# Patient Record
Sex: FEMALE | Race: BLACK OR AFRICAN AMERICAN | NOT HISPANIC OR LATINO | URBAN - METROPOLITAN AREA
[De-identification: names, ages, dates, MRNs, and addresses within clinical notes are randomized per-mention and may not be internally consistent; named-entity substitution may affect disease eponyms.]

---

## 2018-06-24 ENCOUNTER — EMERGENCY (EMERGENCY)
Facility: HOSPITAL | Age: 28
LOS: 0 days | Discharge: HOME | End: 2018-06-25
Attending: EMERGENCY MEDICINE
Payer: SELF-PAY

## 2018-06-24 DIAGNOSIS — M79.89 OTHER SPECIFIED SOFT TISSUE DISORDERS: ICD-10-CM

## 2018-06-24 DIAGNOSIS — L02.612 CUTANEOUS ABSCESS OF LEFT FOOT: ICD-10-CM

## 2018-06-24 DIAGNOSIS — M79.675 PAIN IN LEFT TOE(S): ICD-10-CM

## 2018-06-24 PROCEDURE — 99283 EMERGENCY DEPT VISIT LOW MDM: CPT

## 2018-06-25 VITALS
TEMPERATURE: 98 F | RESPIRATION RATE: 17 BRPM | DIASTOLIC BLOOD PRESSURE: 69 MMHG | HEIGHT: 65 IN | HEART RATE: 91 BPM | WEIGHT: 169.98 LBS | OXYGEN SATURATION: 100 % | SYSTOLIC BLOOD PRESSURE: 132 MMHG

## 2018-06-25 VITALS
SYSTOLIC BLOOD PRESSURE: 157 MMHG | TEMPERATURE: 98 F | OXYGEN SATURATION: 97 % | RESPIRATION RATE: 18 BRPM | DIASTOLIC BLOOD PRESSURE: 91 MMHG | HEART RATE: 94 BPM

## 2018-06-25 RX ORDER — AZTREONAM 2 G
2 VIAL (EA) INJECTION
Qty: 40 | Refills: 0 | OUTPATIENT
Start: 2018-06-25 | End: 2018-07-04

## 2018-06-25 RX ADMIN — Medication 2 TABLET(S): at 05:14

## 2018-06-25 NOTE — ED PROVIDER NOTE - MEDICAL DECISION MAKING DETAILS
drainaged performed. patient now in good condition and discharged with care instructions. she is to follow up with pmd.

## 2018-06-25 NOTE — ED PROVIDER NOTE - OBJECTIVE STATEMENT
Pertinent PMH/PSH/FHx/SHx and Review of Systems contained within:  27 yo f with no PMH presents in ED c/o pain, swelling and drainage from left great toe. Patient reports waking up with symptoms and thinks she was bitten my spider. No aggravating or relieving factors, No fever/chills, No photophobia/eye pain/changes in vision, No ear pain/sore throat/dysphagia, No chest pain/palpitations, no SOB/cough/wheeze/stridor, No abdominal pain, No N/V/D, no dysuria/frequency/discharge, No neck/back pain, no rash, no changes in neurological status/function.

## 2019-02-26 ENCOUNTER — EMERGENCY (EMERGENCY)
Facility: HOSPITAL | Age: 29
LOS: 0 days | Discharge: HOME | End: 2019-02-26
Payer: MEDICAID

## 2019-02-26 VITALS
HEIGHT: 64 IN | RESPIRATION RATE: 19 BRPM | SYSTOLIC BLOOD PRESSURE: 126 MMHG | OXYGEN SATURATION: 100 % | TEMPERATURE: 98 F | WEIGHT: 175.05 LBS | DIASTOLIC BLOOD PRESSURE: 77 MMHG | HEART RATE: 92 BPM

## 2019-02-26 PROCEDURE — 99283 EMERGENCY DEPT VISIT LOW MDM: CPT | Mod: 25

## 2019-02-26 PROCEDURE — 10061 I&D ABSCESS COMP/MULTIPLE: CPT

## 2019-02-26 RX ORDER — IBUPROFEN 200 MG
1 TABLET ORAL
Qty: 28 | Refills: 0
Start: 2019-02-26 | End: 2019-03-04

## 2019-02-26 RX ORDER — IBUPROFEN 200 MG
600 TABLET ORAL ONCE
Qty: 0 | Refills: 0 | Status: COMPLETED | OUTPATIENT
Start: 2019-02-26 | End: 2019-02-26

## 2019-02-26 RX ADMIN — Medication 600 MILLIGRAM(S): at 12:37

## 2019-02-26 RX ADMIN — Medication 600 MILLIGRAM(S): at 12:36

## 2019-02-26 RX ADMIN — Medication 300 MILLIGRAM(S): at 12:37

## 2019-02-26 NOTE — ED ADULT TRIAGE NOTE - CHIEF COMPLAINT QUOTE
patient c/o of L knee pain ( behind the knee ) , red , worm  tach mild swelling , denied chest pain denied difficulty breathing , patient travels for 12 H with the bus yesterday

## 2019-02-26 NOTE — ED PROVIDER NOTE - OBJECTIVE STATEMENT
this is a 27 yo F c/o of left ankle pain and swelling at inner thigh x 2 days. Denies nausea, vomiting, fever, sob, chest pain.

## 2019-02-26 NOTE — ED ADULT NURSE REASSESSMENT NOTE - NS ED NURSE REASSESS COMMENT FT1
patient A&Ox3 in no acute distress , patient refuses the discharge vital sign , as per Myriam HAZEL no need for wound culture a this time , patient OK to be discharge home , patient discharge as orders no heplock left ER self ambulated

## 2019-02-27 DIAGNOSIS — L02.416 CUTANEOUS ABSCESS OF LEFT LOWER LIMB: ICD-10-CM

## 2019-02-27 DIAGNOSIS — M25.572 PAIN IN LEFT ANKLE AND JOINTS OF LEFT FOOT: ICD-10-CM

## 2019-06-20 ENCOUNTER — EMERGENCY (EMERGENCY)
Facility: HOSPITAL | Age: 29
LOS: 0 days | Discharge: HOME | End: 2019-06-20
Attending: EMERGENCY MEDICINE
Payer: MEDICAID

## 2019-06-20 VITALS
HEART RATE: 87 BPM | RESPIRATION RATE: 20 BRPM | OXYGEN SATURATION: 100 % | HEIGHT: 64 IN | DIASTOLIC BLOOD PRESSURE: 73 MMHG | WEIGHT: 169.98 LBS | SYSTOLIC BLOOD PRESSURE: 126 MMHG | TEMPERATURE: 98 F

## 2019-06-20 VITALS
OXYGEN SATURATION: 100 % | SYSTOLIC BLOOD PRESSURE: 101 MMHG | HEART RATE: 75 BPM | RESPIRATION RATE: 18 BRPM | TEMPERATURE: 99 F | DIASTOLIC BLOOD PRESSURE: 59 MMHG

## 2019-06-20 DIAGNOSIS — R51 HEADACHE: ICD-10-CM

## 2019-06-20 DIAGNOSIS — G43.011 MIGRAINE WITHOUT AURA, INTRACTABLE, WITH STATUS MIGRAINOSUS: ICD-10-CM

## 2019-06-20 LAB
ALBUMIN SERPL ELPH-MCNC: 4 G/DL — SIGNIFICANT CHANGE UP (ref 3.3–5)
ALP SERPL-CCNC: 52 U/L — SIGNIFICANT CHANGE UP (ref 40–120)
ALT FLD-CCNC: 15 U/L — SIGNIFICANT CHANGE UP (ref 12–78)
ANION GAP SERPL CALC-SCNC: 6 MMOL/L — SIGNIFICANT CHANGE UP (ref 5–17)
AST SERPL-CCNC: 10 U/L — LOW (ref 15–37)
BASOPHILS # BLD AUTO: 0.03 K/UL — SIGNIFICANT CHANGE UP (ref 0–0.2)
BASOPHILS NFR BLD AUTO: 0.6 % — SIGNIFICANT CHANGE UP (ref 0–2)
BILIRUB SERPL-MCNC: 0.4 MG/DL — SIGNIFICANT CHANGE UP (ref 0.2–1.2)
BUN SERPL-MCNC: 9 MG/DL — SIGNIFICANT CHANGE UP (ref 7–23)
CALCIUM SERPL-MCNC: 8.8 MG/DL — SIGNIFICANT CHANGE UP (ref 8.5–10.1)
CHLORIDE SERPL-SCNC: 107 MMOL/L — SIGNIFICANT CHANGE UP (ref 96–108)
CO2 SERPL-SCNC: 27 MMOL/L — SIGNIFICANT CHANGE UP (ref 22–31)
CREAT SERPL-MCNC: 0.7 MG/DL — SIGNIFICANT CHANGE UP (ref 0.5–1.3)
EOSINOPHIL # BLD AUTO: 0.1 K/UL — SIGNIFICANT CHANGE UP (ref 0–0.5)
EOSINOPHIL NFR BLD AUTO: 2.1 % — SIGNIFICANT CHANGE UP (ref 0–6)
GLUCOSE SERPL-MCNC: 80 MG/DL — SIGNIFICANT CHANGE UP (ref 70–99)
HCG SERPL-ACNC: <1 MIU/ML — SIGNIFICANT CHANGE UP
HCT VFR BLD CALC: 36.4 % — SIGNIFICANT CHANGE UP (ref 34.5–45)
HGB BLD-MCNC: 11.2 G/DL — LOW (ref 11.5–15.5)
IMM GRANULOCYTES NFR BLD AUTO: 0.2 % — SIGNIFICANT CHANGE UP (ref 0–1.5)
LYMPHOCYTES # BLD AUTO: 1.94 K/UL — SIGNIFICANT CHANGE UP (ref 1–3.3)
LYMPHOCYTES # BLD AUTO: 40 % — SIGNIFICANT CHANGE UP (ref 13–44)
MANUAL SMEAR VERIFICATION: SIGNIFICANT CHANGE UP
MCHC RBC-ENTMCNC: 21.8 PG — LOW (ref 27–34)
MCHC RBC-ENTMCNC: 30.8 GM/DL — LOW (ref 32–36)
MCV RBC AUTO: 70.8 FL — LOW (ref 80–100)
MONOCYTES # BLD AUTO: 0.34 K/UL — SIGNIFICANT CHANGE UP (ref 0–0.9)
MONOCYTES NFR BLD AUTO: 7 % — SIGNIFICANT CHANGE UP (ref 2–14)
NEUTROPHILS # BLD AUTO: 2.43 K/UL — SIGNIFICANT CHANGE UP (ref 1.8–7.4)
NEUTROPHILS NFR BLD AUTO: 50.1 % — SIGNIFICANT CHANGE UP (ref 43–77)
NRBC # BLD: 0 /100 WBCS — SIGNIFICANT CHANGE UP (ref 0–0)
PLAT MORPH BLD: NORMAL — SIGNIFICANT CHANGE UP
PLATELET # BLD AUTO: 273 K/UL — SIGNIFICANT CHANGE UP (ref 150–400)
POTASSIUM SERPL-MCNC: 3.9 MMOL/L — SIGNIFICANT CHANGE UP (ref 3.5–5.3)
POTASSIUM SERPL-SCNC: 3.9 MMOL/L — SIGNIFICANT CHANGE UP (ref 3.5–5.3)
PROT SERPL-MCNC: 7.8 GM/DL — SIGNIFICANT CHANGE UP (ref 6–8.3)
RBC # BLD: 5.14 M/UL — SIGNIFICANT CHANGE UP (ref 3.8–5.2)
RBC # FLD: 14.5 % — SIGNIFICANT CHANGE UP (ref 10.3–14.5)
RBC BLD AUTO: SIGNIFICANT CHANGE UP
SODIUM SERPL-SCNC: 140 MMOL/L — SIGNIFICANT CHANGE UP (ref 135–145)
WBC # BLD: 4.85 K/UL — SIGNIFICANT CHANGE UP (ref 3.8–10.5)
WBC # FLD AUTO: 4.85 K/UL — SIGNIFICANT CHANGE UP (ref 3.8–10.5)

## 2019-06-20 PROCEDURE — 99284 EMERGENCY DEPT VISIT MOD MDM: CPT | Mod: 25

## 2019-06-20 PROCEDURE — 70450 CT HEAD/BRAIN W/O DYE: CPT | Mod: 26

## 2019-06-20 RX ORDER — PROPRANOLOL HCL 160 MG
1 CAPSULE, EXTENDED RELEASE 24HR ORAL
Qty: 60 | Refills: 0
Start: 2019-06-20 | End: 2019-07-19

## 2019-06-20 RX ORDER — PROPRANOLOL HCL 160 MG
10 CAPSULE, EXTENDED RELEASE 24HR ORAL ONCE
Refills: 0 | Status: COMPLETED | OUTPATIENT
Start: 2019-06-20 | End: 2019-06-20

## 2019-06-20 RX ORDER — PROPRANOLOL HCL 160 MG
60 CAPSULE, EXTENDED RELEASE 24HR ORAL ONCE
Refills: 0 | Status: DISCONTINUED | OUTPATIENT
Start: 2019-06-20 | End: 2019-06-20

## 2019-06-20 RX ADMIN — Medication 10 MILLIGRAM(S): at 10:53

## 2019-06-20 NOTE — ED PROVIDER NOTE - ASSOCIATED PAIN OR INJURY
Physical Therapy    Received PT eval orders. Chart reviewed. Pt in dialysis and nurse spoke with pt about therapies coming to see her after but pt stated to nursing that even if they came she would not work with them. Nurse states pt is for d/c this day. Will complete order. Please re-consult as appropriate if situation changes.     Nik Keller, PT no discrete location documentation necessary

## 2019-06-20 NOTE — ED ADULT TRIAGE NOTE - CHIEF COMPLAINT QUOTE
pt states, " Last few months I have been having headaches on and off, I was treated for migraines a few ago"

## 2019-06-20 NOTE — ED PROVIDER NOTE - PHYSICAL EXAMINATION
Marx:  General: No distress.  Mentation at baseline.   HEENT: WNL  Chest/Lungs: CTAB, No wheeze, No retractions, No increased work of breathing, Normal rate  Heart: S1S2 RRR, No M/R/G, Pules equal Bilaterally in upper and lower extremities distally  Abd: soft, NT/ND, No guarding, No rebound.  No hernias, no palpable masses.  Extrem: FROM in all joints, no significant edema noted, No ulcers.  Cap refil < 2sec.  Skin: No rash noted, warm dry.  Neuro:  Grossly normal.  No difficulty ambulating. No focal deficits.  Psychiatric: No evidence of delusions. No SI/HI.

## 2019-06-20 NOTE — ED PROVIDER NOTE - CARE PROVIDER_API CALL
Vance Temple (MD)  Neurology; Pain Medicine; Psychiatry  [address] [facility]  [place] [address]  Phone: [phone]  Fax: [phone]  Follow Up Time: 7-10 Days

## 2019-06-20 NOTE — ED ADULT NURSE NOTE - NSIMPLEMENTINTERV_GEN_ALL_ED
Implemented All Fall with Harm Risk Interventions:  [place]din to call system. Call bell, personal items and telephone within reach. Instruct patient to call for assistance. Room bathroom lighting operational. Non-slip footwear when patient is off stretcher. Physically safe environment: no spills, clutter or unnecessary equipment. Stretcher in lowest position, wheels locked, appropriate side rails in place. Provide visual cue, wrist band, yellow gown, etc. Monitor gait and stability. Monitor for mental status changes and reorient to person, place, and time. Review medications for side effects contributing to fall risk. Reinforce activity limits and safety measures with patient and family. Provide visual clues: red socks.

## 2019-06-20 NOTE — ED ADULT TRIAGE NOTE - NS ED NURSE DIRECT TO ROOM YN
Total Square Area In Cm2 (Required For Proper Billing): 40 Comments: Treatment restart on scalp psoriasis. \\n\\n Location #2: PSO - mid occipital scalp Total Energy In Joules: 30.40 Treatment Number: 60 % Increase/Decrease From Last Treatment: 0 Location #1: Chest No Consent: Written consent obtained, risks reviewed including but not limited to crusting, scabbing, blistering, scarring, darker or lighter pigmentary change, incidental hair removal, bruising, and/or incomplete removal. Detail Level: Zone Post-Care Instructions: I reviewed with the patient in detail post-care instructions. Patient should stay away from the sun and wear sun protection until treated areas are fully healed. Dose Setting #1 (Mj/Cm2): 500 Dose Setting #2 (Mj/Cm2): 1150

## 2019-11-05 NOTE — ED ADULT NURSE NOTE - NSSISCREENINGQ3_ED_A_ED
Called dad and explained refill process with controlled medications. Also let him know patient will be due after next 3 months, to call for them and give enough time as it is holiday season.   
Reason for Call:  Medication or medication refill:    Do you use a [facility]?  Name of the pharmacy and phone number for the current request:  [facility] [place]    Name of the medication requested: ADHD med, almost out    Other request: Patients dad was informed of message regarding lab appointment too and would like to know if that is a reason the med is not being filled as set up? Please call and discuss if needed.     Can we leave a detailed message on this number? YES    Phone number patient can be reached at: Cell number on file:    Telephone Information:   Mobile [phone]     Best Time: Any    Call taken on 11/5/2019 at 11:47 AM by Shani Bowman      
No

## 2021-04-02 NOTE — ED PROVIDER NOTE - CPE EDP NEURO NORM
[Dear  ___] : Dear  [unfilled], [Consult Letter:] : I had the pleasure of evaluating your patient, [unfilled]. [Consult Closing:] : Thank you very much for allowing me to participate in the care of this patient.  If you have any questions, please do not hesitate to contact me. [FreeTextEntry1] : \par \par Address: [address] [place] [address]\par Phone: [phone] normal...

## 2022-04-12 NOTE — ED ADULT NURSE NOTE - NS ED NOTE ABUSE SUSPICION NEGLECT YN
[facility]  [facility] [place]      Pediatric Call Center Scheduling and Nurse Questions:  [phone]  Eveline Ramos, RN Care Coordinator    After hours urgent matters that cannot wait until the next business day:  [phone].  Ask for the on-call pediatric doctor for the specialty you are calling for be paged.    For dermatology urgent matters that cannot wait until the next business day, is over a holiday and/or a weekend please call [phone] and ask for the Dermatology Resident On-Call to be paged.    Prescription Renewals:  Please call your pharmacy first.  Your pharmacy must fax requests to [phone].  Please allow 2-3 days for prescriptions to be authorized.    If your physician has ordered a CT or MRI, you may schedule this test by calling [facility] in [place] at [phone].    **If your child is having a sedated procedure, they will need a history and physical done at their Primary Care Provider within 30 days of the procedure.  If your child was seen by the ordering provider in our office within 30 days of the procedure, their visit summary will work for the H&P unless they inform you otherwise.  If you have any questions, please call the RN Care Coordinator.**    **If your child is going to be admitted to [facility] for testing or a procedure, they will need a PCR COVID test within 4 days of admission.  A [facility] scheduling team should be contacting you to schedule.  If you do not hear from them, you can call [phone] to schedule**      
No

## 2023-04-07 NOTE — ED ADULT NURSE NOTE - OBJECTIVE STATEMENT
4
patient c/o of pain behind the L knee started yesterdays , redness worm and pain noted on touch , denied chest pain denied difficulty breathing , denied dizziness

## 2023-05-03 NOTE — ED PROVIDER NOTE - RESPIRATORY, MLM
"Daily Note     Today's date: 5/3/2023  Patient name: Sonam Mccarty  : 1963  MRN: [id number]  Referring provider: Lloyd Xie MD  Dx:   Encounter Diagnosis     ICD-10-CM    1  Neck pain  M54 2                      Subjective: \" I haven't had pain for days but I have some this morning\"     Objective: See treatment diary below    Assessment: Avis Hamilton presents with neck pain  Noted continued restrictions throughout the R side of the neck today- mild improvement post release  Heavy cuing throughout to reduce shoulder hiking with mobility  Tolerated session without adverse effects  Recommend continued skilled therapy to improve overall strength and mobility for functional return with decreased compensation and pain  Plan: Continue per plan of care  Progress treatment as tolerated         Precautions: highly irritable neck and low back symptoms that are chronic, DOESN'T LIKE TO LIE ON BACK AT ALL UNLESS HAS BOLSTER FOR A PILLOW      Manuals 5/3 3/17 3/20 3/23 3/27 3/30 4/3 4/6 4/12 4/25   Seated cerv mx work [address] [facility]  [address]  [address]  [address] K[address] HD                Assess neural tension      This visit                    Neuro Re-Ed             Seated chin tucks 15x5\" holds  10x 3\" holds  12x 5\" holds  12x 5\" holds  15x 5\" holds  15x 5\" holds  15x 5\" holds 15x5\" holds 15x5\" holds                scap 4   20x3\" holds grn low rows, black rows 2x8 6 5 lbs low rows, 10 5 lbs rows  2x10 grn low rows, blue rows  2x10 grn low rows, blue rows  2x10 grn low rows, blue rows  2x12 grn low rows, blue rows 20x 3\" holds grn low rows, black rows  20x 3\" holds grn low rows, black rows 20x3\" holds grn low rows, black rows 20x3\" holds grn low rows, black rows                V upper trap wall slides 2x12  2x10  2x8  2x8  2x10 2x12 2x12 2x12 2x12                             Ther Ex             scap retractions seated             Seated thoracic rotation B                          UBE or treadmill 4 min  4 min  4 min  4 min  4 min  4 min  4 " "min  4 min 4 min   4 min   Seated thoracic extension 15x5\" holds 10x 3\" holds 10x 3\" holds  12x 3\" holds  12x 3\" holds  15x 3\" holds  15x 5\" holds  15x 5\" holds 15x5: holds 15x5: holds   No moneys  2x10 orange  2x10 orange  2x10 orange  2x10 orange  2x12 orange  3x10 orange  20x 3\" holds  20x 3\" holds 20x3\" holds GTB 2x10   Seated CT butterfly stretch 2x10  5\" holds 10x 3\" holds 10x 3\" holds  10x 3\" holds  12x 5\" holds  15x 5\" holds  15x 5\" holds 15x 5\" holds 15x5\" holds 2x10  5\" holds   Doorway pec stretch 3x20\" holds low Low 3x20\" holds  3x20\" holds low  3x20\" holds low 3x20\" holds low 3x20\" holds low 3x20\" holds low  3x20 holds low  3x20 holds low 3x20\" holds low   R first rib mob 3\" holds 2x10    8x 3\" holds  10x 3\" holds  15x 5\" holds 15x 5\" holds 15x5\" holds 3\" holds 2x10   Lying with bolster for pillow thoracic ext over hor       This visit       sidelying shoulder ER R     If tolerated        Ther Activity                                       Gait Training                                       Modalities                                            " Breath sounds clear and equal bilaterally.

## 2023-05-24 ENCOUNTER — EMERGENCY (EMERGENCY)
Facility: HOSPITAL | Age: 33
LOS: 0 days | Discharge: HOME | End: 2023-05-25
Attending: STUDENT IN AN ORGANIZED HEALTH CARE EDUCATION/TRAINING PROGRAM
Payer: MEDICAID

## 2023-05-24 VITALS
TEMPERATURE: 99 F | OXYGEN SATURATION: 100 % | HEART RATE: 83 BPM | SYSTOLIC BLOOD PRESSURE: 135 MMHG | HEIGHT: 65 IN | DIASTOLIC BLOOD PRESSURE: 92 MMHG | RESPIRATION RATE: 18 BRPM | WEIGHT: 199.96 LBS

## 2023-05-24 DIAGNOSIS — R53.1 WEAKNESS: ICD-10-CM

## 2023-05-24 DIAGNOSIS — R68.83 CHILLS (WITHOUT FEVER): ICD-10-CM

## 2023-05-24 DIAGNOSIS — G43.909 MIGRAINE, UNSPECIFIED, NOT INTRACTABLE, WITHOUT STATUS MIGRAINOSUS: ICD-10-CM

## 2023-05-24 DIAGNOSIS — R11.0 NAUSEA: ICD-10-CM

## 2023-05-24 DIAGNOSIS — Z87.42 PERSONAL HISTORY OF OTHER DISEASES OF THE FEMALE GENITAL TRACT: ICD-10-CM

## 2023-05-24 DIAGNOSIS — R10.31 RIGHT LOWER QUADRANT PAIN: ICD-10-CM

## 2023-05-24 DIAGNOSIS — D25.9 LEIOMYOMA OF UTERUS, UNSPECIFIED: ICD-10-CM

## 2023-05-24 LAB
ALBUMIN SERPL ELPH-MCNC: 3.6 G/DL — SIGNIFICANT CHANGE UP (ref 3.3–5)
ALP SERPL-CCNC: 54 U/L — SIGNIFICANT CHANGE UP (ref 40–120)
ALT FLD-CCNC: 15 U/L — SIGNIFICANT CHANGE UP (ref 12–78)
ANION GAP SERPL CALC-SCNC: 5 MMOL/L — SIGNIFICANT CHANGE UP (ref 5–17)
ANISOCYTOSIS BLD QL: SLIGHT — SIGNIFICANT CHANGE UP
APPEARANCE UR: CLEAR — SIGNIFICANT CHANGE UP
AST SERPL-CCNC: 22 U/L — SIGNIFICANT CHANGE UP (ref 15–37)
BACTERIA # UR AUTO: ABNORMAL
BASOPHILS # BLD AUTO: 0 K/UL — SIGNIFICANT CHANGE UP (ref 0–0.2)
BASOPHILS NFR BLD AUTO: 0 % — SIGNIFICANT CHANGE UP (ref 0–2)
BILIRUB SERPL-MCNC: 0.2 MG/DL — SIGNIFICANT CHANGE UP (ref 0.2–1.2)
BILIRUB UR-MCNC: NEGATIVE — SIGNIFICANT CHANGE UP
BUN SERPL-MCNC: 10 MG/DL — SIGNIFICANT CHANGE UP (ref 7–23)
CALCIUM SERPL-MCNC: 8.9 MG/DL — SIGNIFICANT CHANGE UP (ref 8.5–10.1)
CHLORIDE SERPL-SCNC: 107 MMOL/L — SIGNIFICANT CHANGE UP (ref 96–108)
CO2 SERPL-SCNC: 27 MMOL/L — SIGNIFICANT CHANGE UP (ref 22–31)
COLOR SPEC: YELLOW — SIGNIFICANT CHANGE UP
CREAT SERPL-MCNC: 0.8 MG/DL — SIGNIFICANT CHANGE UP (ref 0.5–1.3)
DIFF PNL FLD: ABNORMAL
EGFR: 100 ML/MIN/1.73M2 — SIGNIFICANT CHANGE UP
EOSINOPHIL # BLD AUTO: 0 K/UL — SIGNIFICANT CHANGE UP (ref 0–0.5)
EOSINOPHIL NFR BLD AUTO: 0 % — SIGNIFICANT CHANGE UP (ref 0–6)
EPI CELLS # UR: ABNORMAL
GLUCOSE SERPL-MCNC: 91 MG/DL — SIGNIFICANT CHANGE UP (ref 70–99)
GLUCOSE UR QL: NEGATIVE MG/DL — SIGNIFICANT CHANGE UP
HCG SERPL-ACNC: <1 MIU/ML — SIGNIFICANT CHANGE UP
HCT VFR BLD CALC: 31.1 % — LOW (ref 34.5–45)
HGB BLD-MCNC: 9.5 G/DL — LOW (ref 11.5–15.5)
HYPOCHROMIA BLD QL: SIGNIFICANT CHANGE UP
KETONES UR-MCNC: NEGATIVE — SIGNIFICANT CHANGE UP
LACTATE SERPL-SCNC: 0.6 MMOL/L — LOW (ref 0.7–2)
LEUKOCYTE ESTERASE UR-ACNC: NEGATIVE — SIGNIFICANT CHANGE UP
LYMPHOCYTES # BLD AUTO: 2.54 K/UL — SIGNIFICANT CHANGE UP (ref 1–3.3)
LYMPHOCYTES # BLD AUTO: 39 % — SIGNIFICANT CHANGE UP (ref 13–44)
MANUAL SMEAR VERIFICATION: SIGNIFICANT CHANGE UP
MCHC RBC-ENTMCNC: 20.6 PG — LOW (ref 27–34)
MCHC RBC-ENTMCNC: 30.5 G/DL — LOW (ref 32–36)
MCV RBC AUTO: 67.5 FL — LOW (ref 80–100)
MICROCYTES BLD QL: SIGNIFICANT CHANGE UP
MONOCYTES # BLD AUTO: 0.39 K/UL — SIGNIFICANT CHANGE UP (ref 0–0.9)
MONOCYTES NFR BLD AUTO: 6 % — SIGNIFICANT CHANGE UP (ref 2–14)
NEUTROPHILS # BLD AUTO: 3.51 K/UL — SIGNIFICANT CHANGE UP (ref 1.8–7.4)
NEUTROPHILS NFR BLD AUTO: 54 % — SIGNIFICANT CHANGE UP (ref 43–77)
NITRITE UR-MCNC: NEGATIVE — SIGNIFICANT CHANGE UP
NRBC # BLD: 0 /100 — SIGNIFICANT CHANGE UP (ref 0–0)
NRBC # BLD: SIGNIFICANT CHANGE UP /100 WBCS (ref 0–0)
PH UR: 6.5 — SIGNIFICANT CHANGE UP (ref 5–8)
PLAT MORPH BLD: NORMAL — SIGNIFICANT CHANGE UP
PLATELET # BLD AUTO: 311 K/UL — SIGNIFICANT CHANGE UP (ref 150–400)
POTASSIUM SERPL-MCNC: 4.5 MMOL/L — SIGNIFICANT CHANGE UP (ref 3.5–5.3)
POTASSIUM SERPL-SCNC: 4.5 MMOL/L — SIGNIFICANT CHANGE UP (ref 3.5–5.3)
PROT SERPL-MCNC: 7.2 GM/DL — SIGNIFICANT CHANGE UP (ref 6–8.3)
PROT UR-MCNC: 15 MG/DL
RBC # BLD: 4.61 M/UL — SIGNIFICANT CHANGE UP (ref 3.8–5.2)
RBC # FLD: 15.4 % — HIGH (ref 10.3–14.5)
RBC BLD AUTO: ABNORMAL
RBC CASTS # UR COMP ASSIST: SIGNIFICANT CHANGE UP /HPF (ref 0–4)
SODIUM SERPL-SCNC: 139 MMOL/L — SIGNIFICANT CHANGE UP (ref 135–145)
SP GR SPEC: 1.01 — SIGNIFICANT CHANGE UP (ref 1.01–1.02)
UROBILINOGEN FLD QL: NEGATIVE MG/DL — SIGNIFICANT CHANGE UP
VARIANT LYMPHS # BLD: 1 % — SIGNIFICANT CHANGE UP (ref 0–6)
WBC # BLD: 6.5 K/UL — SIGNIFICANT CHANGE UP (ref 3.8–10.5)
WBC # FLD AUTO: 6.5 K/UL — SIGNIFICANT CHANGE UP (ref 3.8–10.5)
WBC UR QL: SIGNIFICANT CHANGE UP

## 2023-05-24 PROCEDURE — 74177 CT ABD & PELVIS W/CONTRAST: CPT | Mod: 26,MA

## 2023-05-24 PROCEDURE — 76856 US EXAM PELVIC COMPLETE: CPT | Mod: 26

## 2023-05-24 PROCEDURE — 99285 EMERGENCY DEPT VISIT HI MDM: CPT

## 2023-05-24 RX ORDER — MORPHINE SULFATE 50 MG/1
2 CAPSULE, EXTENDED RELEASE ORAL ONCE
Refills: 0 | Status: DISCONTINUED | OUTPATIENT
Start: 2023-05-24 | End: 2023-05-24

## 2023-05-24 RX ORDER — ACETAMINOPHEN 500 MG
1000 TABLET ORAL ONCE
Refills: 0 | Status: COMPLETED | OUTPATIENT
Start: 2023-05-24 | End: 2023-05-24

## 2023-05-24 RX ORDER — ONDANSETRON 8 MG/1
2 TABLET, FILM COATED ORAL ONCE
Refills: 0 | Status: COMPLETED | OUTPATIENT
Start: 2023-05-24 | End: 2023-05-24

## 2023-05-24 RX ORDER — SODIUM CHLORIDE 9 MG/ML
1000 INJECTION INTRAMUSCULAR; INTRAVENOUS; SUBCUTANEOUS ONCE
Refills: 0 | Status: COMPLETED | OUTPATIENT
Start: 2023-05-24 | End: 2023-05-24

## 2023-05-24 RX ADMIN — MORPHINE SULFATE 2 MILLIGRAM(S): 50 CAPSULE, EXTENDED RELEASE ORAL at 19:49

## 2023-05-24 RX ADMIN — SODIUM CHLORIDE 1000 MILLILITER(S): 9 INJECTION INTRAMUSCULAR; INTRAVENOUS; SUBCUTANEOUS at 20:45

## 2023-05-24 RX ADMIN — Medication 1000 MILLIGRAM(S): at 20:16

## 2023-05-24 RX ADMIN — SODIUM CHLORIDE 1000 MILLILITER(S): 9 INJECTION INTRAMUSCULAR; INTRAVENOUS; SUBCUTANEOUS at 19:45

## 2023-05-24 RX ADMIN — ONDANSETRON 2 MILLIGRAM(S): 8 TABLET, FILM COATED ORAL at 19:50

## 2023-05-24 RX ADMIN — Medication 400 MILLIGRAM(S): at 20:01

## 2023-05-24 RX ADMIN — MORPHINE SULFATE 2 MILLIGRAM(S): 50 CAPSULE, EXTENDED RELEASE ORAL at 20:16

## 2023-05-24 NOTE — ED PROVIDER NOTE - PATIENT PORTAL LINK FT
You can access the FollowMyHealth Patient Portal offered by [facility] by registering at the following website: [email or web address]. By joining [facility]’s FollowMyHealth portal, you will also be able to view your health information using other applications (apps) compatible with our system.

## 2023-05-24 NOTE — ED PROVIDER NOTE - OBJECTIVE STATEMENT
33 year old female with no past medical history presents today c/o abdominal pain since last night, pt describes intermittent sharp pains in her rlq associated with nausea, chills and generalized weakness, pt did take tylenol without relief, pt denies sick contacts +h/o fibroids, pt noted to have prolonged period, lmp 5/14/23, pt currently still menstruation

## 2023-05-24 NOTE — ED ADULT TRIAGE NOTE - CHIEF COMPLAINT QUOTE
patient c/o of RLQ abdominal pain started today , denied N/V denied constipation , no other c/o at this time

## 2023-05-24 NOTE — ED ADULT NURSE NOTE - OBJECTIVE STATEMENT
A&OX4. Patient C/O RLQ 9/10 intermitted abdominal pain started last night. patient denies Fevers/  burning with urination/ back pain at this time. patient denies any recent heavy lifting  at this time . LMP 5/14. Patient states longer than normal  menstruation at this time. + blood in urine no PMH

## 2023-05-24 NOTE — ED ADULT TRIAGE NOTE - RESPIRATORY RATE (BREATHS/MIN)
18 Cimzia Counseling:  I discussed with the patient the risks of Cimzia including but not limited to immunosuppression, allergic reactions and infections.  The patient understands that monitoring is required including a PPD at baseline and must alert us or the primary physician if symptoms of infection or other concerning signs are noted.

## 2023-05-24 NOTE — ED PROVIDER NOTE - CARE PROVIDER_API CALL
Mekhi Wynn  OBSTETRICS AND GYNECOLOGY  [address] [id number]  Phone: [phone]  Fax: [phone]  Follow Up Time:

## 2023-05-24 NOTE — ED PROVIDER NOTE - CLINICAL SUMMARY MEDICAL DECISION MAKING FREE TEXT BOX
pt presents today c/o rlq abd pain since lastnight associated with nausea, chills and generalized weakness, pt has h/o fibroids in the past which was removed when she had a c section in the past, her lmp is 5/14/23, continues today, will order labs, ivf, pain control, ct to rule out appy, sono to rule out fibroids, reassess and dispo

## 2023-05-24 NOTE — ED ADULT NURSE NOTE - ED STAT RN HANDOFF DETAILS
Report endorsed to onclev contreras  RN. Safety checks compld this shift/Safety rounds completed hourly.

## 2023-05-25 VITALS
TEMPERATURE: 98 F | OXYGEN SATURATION: 98 % | DIASTOLIC BLOOD PRESSURE: 91 MMHG | SYSTOLIC BLOOD PRESSURE: 130 MMHG | RESPIRATION RATE: 16 BRPM | HEART RATE: 84 BPM

## 2023-05-25 RX ORDER — IBUPROFEN 200 MG
600 TABLET ORAL ONCE
Refills: 0 | Status: COMPLETED | OUTPATIENT
Start: 2023-05-25 | End: 2023-05-25

## 2023-05-25 RX ORDER — IBUPROFEN 200 MG
1 TABLET ORAL
Qty: 20 | Refills: 0
Start: 2023-05-25 | End: 2023-05-29

## 2023-05-25 RX ADMIN — Medication 600 MILLIGRAM(S): at 01:53

## 2023-05-25 NOTE — ED ADULT NURSE REASSESSMENT NOTE - NS ED NURSE REASSESS COMMENT FT1
Pt AAOx4. Steady gait, ambulatory. IV removed. D/C per MD orders. D/C instructions provided and verbalizes understanding of medication regimen and follow up care. Educational material provided. Respirations equal and unlabored with no acute distress noted at this time. Pt denies any pain or complaints at this time.
Report received from RADHA Rivas. patient is a&ox4 on stretcher. Patient awaiting to be seen by provider. NAD noted at this time. No pending orders.
patient returns from [facility]. patient states pain has been constant 8/10, but does not desire additional pain medication at this time. Urine collected and sent. patient going to CT.
patient remains a&ox4 stating pain 8/10, after medication given. patient states "pain is a lot more tolerable now" Patient was offered more pain alternatives, patient states "I do not need any more pain medicine right now, I am ok". NAD noted. Awaiting sono/CT
Pt AAOx4. Pt sitting comfortably in bed with complaint of pelvic pain, Dr Moody made aware. No new orders at this time. Respirations equal and unlabored. No acute distress noted at this time.

## 2023-05-25 NOTE — ED ADULT NURSE REASSESSMENT NOTE - GENERAL PATIENT STATE
comfortable appearance/cooperative
comfortable appearance/cooperative/improvement verbalized/resting/sleeping

## 2023-05-26 LAB
CULTURE RESULTS: SIGNIFICANT CHANGE UP
SPECIMEN SOURCE: SIGNIFICANT CHANGE UP

## 2023-10-05 NOTE — ED ADULT NURSE NOTE - NS ED NOTE  TALK SOMEONE YN
Due to COVID-19 precautions, case collaborations are temporarily being conducted via remote basis.    [facility] Clinical Collaboration Meeting Log    [facility]bur[facility] [id number]    Provider:Claribel Wagner LCSW    Date of consultation:  10/5/2023     [facility] Consultation    Clinicians Present: GABY Centeno    Reason for Seeking Collaboration:  At risk behaviors including suicidal ideation (SI), homicidal ideation (HI), cutting, etc.    Recommendations from Collaboration:  Agree with/confirm current treatment plan     No

## 2024-03-15 NOTE — ED PROVIDER NOTE - CONTEXT
Airway    Performed by: Cara Oviedo CRNA  Authorized by: Cara Oviedo CRNA    Final Airway Type:  Endotracheal airway  Final Endotracheal Airway*:  ETT  ETT Size (mm)*:  7.5  Cuff*:  Regular  Technique Used for Successful ETT Placement:  Video laryngoscopy  Devices/Methods Used in Placement*:  Mask  Intubation Procedure*:  Preoxygenation, ETCO2, Atraumatic, Dentition Unchanged and Pharynx Clear  Insertion Site:  Oral  Blade Type*:  Video Laryngoscope  Blade Size*:  3  Cuff Volume (mL):  5  Measured from*:  Lips  Secured at (cm)*:  25  Placement Verified by: auscultation and capnometry    Glottic View*:  1 - full view of glottis  Attempts*:  1   Patient Identified, Procedure confirmed, Emergency equipment available and Safety protocols followed  Location:  OR  Urgency:  Elective  Difficult Airway: No    Indications for Airway Management:  Anesthesia  Mask Difficulty Assessment:  2 - vent by mask + OA or adjuvant +/- NMBA  Start Time: 3/15/2024 2:06 PM       unknown

## 2024-05-01 NOTE — ED ADULT NURSE NOTE - [facility]
Physical Therapy    PELVIC FLOOR EVALUATION AND TREATMENT    Name: Patty Johnson  MRN: [id number]  : 1948  Today's Date: 24     Time Calculation  Start Time: 1118  Stop Time: 1220  Time Calculation (min): 62 min  11:18-12:20  Assessment: The patient is a 76 y.o. female, with history of overactive bladder, presenting with chief complaint of urinary urgency, frequency, incontinence and nocturia.  Patient was recently having trouble passing bowel movements, but is now resolved with use of fiber supplements and stool softener.  She does have mild fecal urgency, but no incontinence.  She has found great relief with botox injections.  The evaluation focused on education today and assessing hip and core strength.  A pelvic floor muscle assessment will be completed at the next session per patient comfort level and consent.  She presents with significant muscular weakness in hips and core.  Due to this weakness, bed mobility and sit to stand transfers are quite difficult and she was found to be holding her breath to perform these activities.  She was educated today on techniques to manage intra-abdominal pressure using exhalation and abdominal bracing to reduce urinary leakage during these functional tasks.  Return of LE edema toward the urinary system at night may contribute to nocturia.  She will likely benefit from skilled PT to gain strength and pressure management skills to assist with urinary control, rectocele management, ease of bladder emptying to improve her sleep quality and overall quality of life.         Plan:   Treatment/Interventions: Biofeedback, Education/ Instruction, Electrical stimulation, Neuromuscular re-education, Therapeutic activities, Therapeutic exercises  PT Plan: Skilled PT  PT Frequency: 1 time per week  Duration: 6-8 weeks  Onset Date: 24  Certification Period Start Date: 24  Certification Period End Date: 24  Number of Treatments Authorized: No  authorization  Rehab Potential: Good  Plan of Care Agreement: Patient    Interventions: Therapeutic Activity: Pt was educated on PT clinical findings, extensively pelvic anatomy as it relates to symptoms and diagnosis, PT POC and initial HEP with oral and written instruction provided. Patient was instructed to avoid breath holding with bed mobility and sit to stand transfers, rather exhale and engage abdominals as she moves.  She was educated on importance of avoiding pushing to completely empty her bladder, rather use proper posture and breathing techniques instead to avoid increasing prolapse symptoms.  She was educated on the importance of keeping bowels moving well to reduce prolapse symptoms, improving daily water intake to support this.       Current Problem:  1. Muscle weakness        2. Pelvic pain  Referral to Physical Therapy    Follow Up In Physical Therapy      3. Urinary incontinence, unspecified type            Subjective   General  Reason for Referral: R10.2 (ICD-10-CM) - Pelvic pain  Referred By: Dr. Vaughan  Past Medical History Relevant to Rehab: HTN, Asthma, OA, Kidney Disease  Precautions: L NARESH: 8/23, R NARESH 2/15, fell and broke right shoulder 2 years ago and lacks ROM, hands go numb at night and when using phone, [employer]        Pain: no pelvic pain       Objective   PELVIC HISTORY:  Chief Complaint/Description of Symptoms:  Patient reports that she thinks she has a rectal prolapse.  She was blocked up at one point and struggling with bowel movements, but is no longer having that issue, as she is taking a stool softener at night and Fiber supplement during day.  This began after hip surgery last year due to be on pain meds,  now resolved.  Has struggled a lot with OAB - now has much better urinary control with Botox injections.  Last one he used 2x the Botox and that worked very well. Could sleep 6 hours, where before she would get up every 1-2 hours. Her leakage resolved.  Had a stimulator  "implanted but it was not effective, so had to be removed.    Next botox is scheduled for next week because she is regressed and now getting up every 4 hours, and leakage has returned. Must wear a pad just in case and at night.  When she moves to get up at night she leaks.  Medications in the past haven't been helpful. Prior to Botox she had no control. Patient reports some history of vulvar irritation, but used a cream, which helped.  Hasn't hd to use it in awhile.  Wears compression stockings due to LE edema.    HPI: Delivered 3 children, all vaginal births, had tearing.  Hx of d&C, She reports that she doesn't walk well.  She did not do therapy after her last NARESH  Home Environment/Social Factors/Occupation: Retired, , Multi story home.  Shower is upstairs, has 1 HR. 6 steps to enter with 2 HR, but they are very wide and can only use one.      PELVIC PAIN: No pain       Since onset, the symptoms are: improving with Botox  Pain when emptying bladder: No  Pain with wiping or tight clothing: No, occasionally has some burning - used a cream, but has not had to use it in awhile.    Pain with intercourse: not currently active, no pain in the past    EXERCISE:  Do you do Kegels? no   Current exercise regime: no    BLADDER:     Excessive Urinary Urgency:  not really  Daytime Voiding Frequency: every 4 hours \" I don't drink enough water.\"   Nighttime Voiding Frequency: every 4 hours (was every 6 hrs)  Unintentional urine loss frequency: every time she turns over  or sits up   Leakage occurs with: sit up and then stands up (no leakage in January after Botox)  Leakage amount: small  Difficulty initiating flow of urine: no  Slow/weak urine stream: strong   Difficulty starting urine stream/push to urinate: see below  Able to completely empty bladder: sometimes has to wait 30 seconds and then pushes to get the rest out  Frequent UTI's: rarely (in the last couple of years    BOWEL:     Excessive Bowel Urgency: Yes  BM " Frequency: generally daily, multiple times per day  Frequent Diarrhea: NO  Frequent constipation/straining/incomplete emptying: once in awhile  Mitchell Stool Scale rating: small balls most recently.   Unintentional stool loss: no  Stool loss frequency: NA  FI occurs with what activity: NA  FI amount: NA  Consistency of stool when FI occurs: NA  Tests performed by doctor: last colonoscopy over 5 years ago.          INTERNAL VAGINAL EXAMINATION: TBA    POSTURE: Fwd head, rounded shoulders,  Gait:  Independent without a.d., decreased speed, [place]  Transfers: multiple attempts and heavy UE support required for sit to stand transfers, breath holding  Bed mobility: independent, takes increased time, uses log roll     MMT R/L:  Hip flex: 4/4-  Hip abd: 4-/3+  Hip ext: good bridge/good bridge  TA: able to engage      OUTCOMES MEASURE:  PFIQ-7: 18.9    Education:  Outpatient Education  Individual(s) Educated: Patient  Education Provided: Anatomy, Body Mechanics, Home Exercise Program, Physiology, POC  Plan of Care Discussed and Agreed Upon: yes  Patient Response to Education: Patient/Caregiver Verbalized Understanding of Information, Patient/Caregiver Performed Return Demonstration of Exercises/Activities, Patient/Caregiver Asked Appropriate Questions    Careplan Goals:  Problem: Pelvic Floor       Goal: Patient will improve score on PFIQ-7 by 12%  to show a clinically important change in urinary control and function.           Goal: Patient will be independent with pressure management strategies to reduce urinary incontinence and to avoid rectocele/prolapse symptoms.          Problem: Pelvic Floor        Goal: Patient will report ability to perform bed mobility and sit to stand transfers without urinary leakage 90% of the time.           Goal: Patient will normalize day-time voids to every 2-4 hours.           Goal: Patient will report reduction of nocturia to 2x per night or better.          Goal: Patient will  demonstrate appropriate fluid management/water intake  for bladder and bowel health.           Goal: Patient will report increased ease of bladder and bowel emptying without strain.           Goal: Patient will increase hip and trunk strength by 1/3 MMT grade to facilitate muscle performance necessary for ease of functional transfers and urinary control.             Goal: Patient will be independent and adherent to HEP to enhance functional progress and long term management of condition.            Asya Bernabe, PT   [employer]

## 2025-07-31 ENCOUNTER — APPOINTMENT (OUTPATIENT)
Dept: DERMATOLOGY | Facility: CLINIC | Age: 35
End: 2025-07-31
Payer: COMMERCIAL

## 2025-07-31 DIAGNOSIS — L81.9 DISORDER OF PIGMENTATION, UNSPECIFIED: ICD-10-CM

## 2025-07-31 DIAGNOSIS — L70.0 ACNE VULGARIS: ICD-10-CM

## 2025-07-31 DIAGNOSIS — L90.5 SCAR CONDITIONS AND FIBROSIS OF SKIN: ICD-10-CM

## 2025-07-31 DIAGNOSIS — L73.9 FOLLICULAR DISORDER, UNSPECIFIED: ICD-10-CM

## 2025-07-31 PROBLEM — Z00.00 ENCOUNTER FOR PREVENTIVE HEALTH EXAMINATION: Status: ACTIVE | Noted: 2025-07-31

## 2025-07-31 PROCEDURE — 99204 OFFICE O/P NEW MOD 45 MIN: CPT

## 2025-07-31 RX ORDER — DOXYCYCLINE HYCLATE 100 MG/1
100 TABLET, COATED ORAL
Qty: 60 | Refills: 0 | Status: ACTIVE | COMMUNITY
Start: 2025-07-31 | End: 1900-01-01

## 2025-07-31 RX ORDER — CLINDAMYCIN PHOSPHATE 10 MG/ML
1 LOTION TOPICAL
Qty: 1 | Refills: 3 | Status: ACTIVE | COMMUNITY
Start: 2025-07-31 | End: 1900-01-01

## 2025-07-31 RX ORDER — TRETINOIN 0.25 MG/G
0.03 CREAM TOPICAL
Qty: 1 | Refills: 3 | Status: ACTIVE | COMMUNITY
Start: 2025-07-31 | End: 1900-01-01